# Patient Record
Sex: MALE | Race: WHITE | Employment: FULL TIME | URBAN - METROPOLITAN AREA
[De-identification: names, ages, dates, MRNs, and addresses within clinical notes are randomized per-mention and may not be internally consistent; named-entity substitution may affect disease eponyms.]

---

## 2021-08-30 ENCOUNTER — HOSPITAL ENCOUNTER (EMERGENCY)
Facility: CLINIC | Age: 26
Discharge: HOME | End: 2021-08-30
Attending: PHYSICIAN ASSISTANT | Admitting: PHYSICIAN ASSISTANT
Payer: OTHER MISCELLANEOUS

## 2021-08-30 VITALS
TEMPERATURE: 97.7 F | DIASTOLIC BLOOD PRESSURE: 87 MMHG | RESPIRATION RATE: 16 BRPM | SYSTOLIC BLOOD PRESSURE: 129 MMHG | HEART RATE: 60 BPM | OXYGEN SATURATION: 100 %

## 2021-08-30 DIAGNOSIS — S61.209A EXTENSOR TENDON LACERATION, FINGER, OPEN WOUND, INITIAL ENCOUNTER: ICD-10-CM
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
## 2021-08-30 DIAGNOSIS — S56.429A EXTENSOR TENDON LACERATION, FINGER, OPEN WOUND, INITIAL ENCOUNTER: ICD-10-CM

## 2021-08-30 PROCEDURE — 250N000013 HC RX MED GY IP 250 OP 250 PS 637: Performed by: PHYSICIAN ASSISTANT

## 2021-08-30 PROCEDURE — 12001 RPR S/N/AX/GEN/TRNK 2.5CM/<: CPT

## 2021-08-30 PROCEDURE — 99283 EMERGENCY DEPT VISIT LOW MDM: CPT

## 2021-08-30 RX ORDER — CEPHALEXIN 500 MG/1
500 CAPSULE ORAL ONCE
Status: COMPLETED | OUTPATIENT
Start: 2021-08-30 | End: 2021-08-30

## 2021-08-30 RX ORDER — CEPHALEXIN 500 MG/1
500 CAPSULE ORAL 4 TIMES DAILY
Qty: 12 CAPSULE | Refills: 0 | Status: SHIPPED | OUTPATIENT
Start: 2021-08-30 | End: 2021-09-02

## 2021-08-30 RX ADMIN — CEPHALEXIN 500 MG: 500 CAPSULE ORAL at 18:28

## 2021-08-30 ASSESSMENT — ENCOUNTER SYMPTOMS: WOUND: 1

## 2021-08-30 NOTE — Clinical Note
Ciro Alexander was seen and treated in our emergency department on 8/30/2021.  He may return to work on 09/01/2021.  He was seen and evaluated in the emergency department today for a work related injury. He has a laceration of the skin and extensor tendon of his finger. He will require orthopedic consultation and repair of his injury at this time. His date to return to work is dependent on the expertise of the orthopedic team.      If you have any questions or concerns, please don't hesitate to call.      hSun Ferguson PA-C

## 2021-08-30 NOTE — ED TRIAGE NOTES
"Pt reports that he was using a  and the blade fell out of the knife, this then cut right pointer finger. PT CMS intact. Went to [facility] but nothing done and was told \" you need to see a surgeon\"   "

## 2021-08-30 NOTE — ED NOTES
Emergency Department Technician Wound Irrigation Note:    8/30/2021    6:00 PM      Wound location:  Right second finger    Irrigation Fluid: Normal Saline    Estimated Irrigation Volume (60 mL fluid per cm): 500    Adan Louie

## 2022-11-03 NOTE — ED PROVIDER NOTES
History     Chief Complaint:  Hand Injury       The history is provided by the patient.      Ciro Alexander is a 25 year old male who presents for evaluation of hand laceration. A while using a  the blade fell out of the knife and cut his right pointer finger. CMS is intact. He went to urgent care for the injury and was informed that he needed to see a surgeon. Patient is right handed. He reports difficulty straightening the finger after the injury. It is approximately 2 cm long.    Review of Systems   Skin: Positive for wound.   All other systems reviewed and are negative.      Allergies:  The patient has no known allergies.     Medications:  Not currently prescribed medications.     Past Medical History:     No past medical history on file.     Social History:  The patient was accompanied to the ED by himself.  Arrived by private vehicle.     Physical Exam     Patient Vitals for the past 24 hrs:   BP Temp Temp src Pulse Resp SpO2   08/30/21 1607 129/87 -- -- -- -- --   08/30/21 1604 -- 97.7  F (36.5  C) Oral 60 16 100 %     Physical Exam  Vitals and nursing note reviewed.   Constitutional:       General: He is not in acute distress.     Appearance: He is not diaphoretic.   HENT:      Head: Normocephalic and atraumatic.   Eyes:      General: No scleral icterus.  Cardiovascular:      Rate and Rhythm: Normal rate and regular rhythm.      Pulses: Normal pulses.   Pulmonary:      Effort: Pulmonary effort is normal. No respiratory distress.   Musculoskeletal:         General: No tenderness.      Comments: Laceration 2.5cm full thickness of the R hand index finger extensor surface just distal to the MCP joint extending radially. There is obvious evidence of laceration to the extensor tendon in the area of injury. The distal tendon is visualized however the proximal portion is not throughout pROM. Sensation intact distally. Unable to extend to 180 degrees, extension to 135 degrees noted.    Skin:      General: Skin is warm.      Findings: No rash.   Neurological:      Mental Status: He is alert.       Emergency Department Course     Procedures:      Laceration Repair        LACERATION:  A muscle clean 2.5 cm laceration.      LOCATION:  Right Second Finger      FUNCTION:  Distally circulation are intact. Diminished sensation. No motor and tendon function.      ANESTHESIA:  Local using Maricaine total of 3 mLs      PREPARATION:  Irrigation with Normal Saline and Shur Clens      DEBRIDEMENT:  wound explored, no foreign body found      CLOSURE:  Wound was closed with One Layer.  Skin closed with 5 x 4.0 Ethylon using interrupted sutures.      Emergency Department Course:    Reviewed:    I reviewed the patient's nursing notes, vitals, past history and care everywhere    Assessments:    1715 I performed an exam of the patient as documented above.     1806 I performed the above procedure.     1825 I rechecked the patient and explained findings.    Consults:     1823 I consulted with Dr. Reed, Hand Surgeon, about the patient and made a plan for his appointment.      Interventions:  1828 Keflex 500 mg PO    Disposition:  The patient was discharged to home.     Impression & Plan         Medical Decision Making:  Ciro Alexander is a 25 year old male who presents to the emergency department today for evaluation of a hand injury. Here in the ED he demonstrates a deep laceration, tendon injury. Wound was irrigated, loosely closed, splint placed, orthopedics consulted, outpatient orthopedic arrangements were made. Antibiotics initiated in the ED.     Critical Care time:  none    Diagnosis:    ICD-10-CM    1. Work related injury  Y99.0    2. Extensor tendon laceration, finger, open wound, initial encounter  S56.429A     S61.209A        Discharge Medications:  New Prescriptions    CEPHALEXIN (KEFLEX) 500 MG CAPSULE    Take 1 capsule (500 mg) by mouth 4 times daily for 3 days       Scribe Disclosure:  Matilde AN am  serving as a scribe at 5:04 PM on 8/30/2021 to document services personally performed by Shun Ferguson PA-C based on my observations and the provider's statements to me.     [facility] EMERGENCY DEPT         Shun Ferguson PA-C  08/30/21 2031     [id number]

## (undated) RX ORDER — BUPIVACAINE HYDROCHLORIDE 5 MG/ML
INJECTION, SOLUTION PERINEURAL
Status: DISPENSED
Start: 2021-08-30